# Patient Record
Sex: MALE | Race: WHITE
[De-identification: names, ages, dates, MRNs, and addresses within clinical notes are randomized per-mention and may not be internally consistent; named-entity substitution may affect disease eponyms.]

---

## 2022-03-31 ENCOUNTER — HOSPITAL ENCOUNTER (INPATIENT)
Dept: HOSPITAL 26 - MED | Age: 57
LOS: 4 days | Discharge: HOME | DRG: 189 | End: 2022-04-04
Attending: STUDENT IN AN ORGANIZED HEALTH CARE EDUCATION/TRAINING PROGRAM | Admitting: STUDENT IN AN ORGANIZED HEALTH CARE EDUCATION/TRAINING PROGRAM
Payer: COMMERCIAL

## 2022-03-31 VITALS — WEIGHT: 159 LBS | BODY MASS INDEX: 24.1 KG/M2 | HEIGHT: 68 IN

## 2022-03-31 VITALS — SYSTOLIC BLOOD PRESSURE: 139 MMHG | DIASTOLIC BLOOD PRESSURE: 72 MMHG

## 2022-03-31 DIAGNOSIS — Z87.891: ICD-10-CM

## 2022-03-31 DIAGNOSIS — Z20.822: ICD-10-CM

## 2022-03-31 DIAGNOSIS — J44.1: ICD-10-CM

## 2022-03-31 DIAGNOSIS — Z79.899: ICD-10-CM

## 2022-03-31 DIAGNOSIS — J96.22: ICD-10-CM

## 2022-03-31 DIAGNOSIS — J96.21: Primary | ICD-10-CM

## 2022-03-31 LAB
ALBUMIN FLD-MCNC: 3.4 G/DL (ref 3.4–5)
ANION GAP SERPL CALCULATED.3IONS-SCNC: 3.4 MMOL/L (ref 8–16)
AST SERPL-CCNC: 34 U/L (ref 15–37)
BASOPHILS # BLD AUTO: 0.1 K/UL (ref 0–0.22)
BASOPHILS NFR BLD AUTO: 0.7 % (ref 0–2)
BILIRUB SERPL-MCNC: 0.2 MG/DL (ref 0–1)
BUN SERPL-MCNC: 11 MG/DL (ref 7–18)
CHLORIDE SERPL-SCNC: 96 MMOL/L (ref 98–107)
CO2 SERPL-SCNC: 44.5 MMOL/L (ref 21–32)
CREAT SERPL-MCNC: 0.5 MG/DL (ref 0.6–1.3)
EOSINOPHIL # BLD AUTO: 0.1 K/UL (ref 0–0.4)
EOSINOPHIL NFR BLD AUTO: 0.8 % (ref 0–4)
ERYTHROCYTE [DISTWIDTH] IN BLOOD BY AUTOMATED COUNT: 14.2 % (ref 11.6–13.7)
GFR SERPL CREATININE-BSD FRML MDRD: 220 ML/MIN (ref 90–?)
GLUCOSE SERPL-MCNC: 198 MG/DL (ref 74–106)
HCT VFR BLD AUTO: 39.1 % (ref 36–52)
HGB BLD-MCNC: 12.6 G/DL (ref 12–18)
LYMPHOCYTES # BLD AUTO: 0.3 K/UL (ref 2–11.5)
LYMPHOCYTES NFR BLD AUTO: 3.6 % (ref 20.5–51.1)
MCH RBC QN AUTO: 30 PG (ref 27–31)
MCHC RBC AUTO-ENTMCNC: 32 G/DL (ref 33–37)
MCV RBC AUTO: 94.1 FL (ref 80–94)
MONOCYTES # BLD AUTO: 0.3 K/UL (ref 0.8–1)
MONOCYTES NFR BLD AUTO: 3.6 % (ref 1.7–9.3)
NEUTROPHILS # BLD AUTO: 8.2 K/UL (ref 1.8–7.7)
NEUTROPHILS NFR BLD AUTO: 91.3 % (ref 42.2–75.2)
PLATELET # BLD AUTO: 217 K/UL (ref 140–450)
POTASSIUM SERPL-SCNC: 4.9 MMOL/L (ref 3.5–5.1)
PROTHROMBIN TIME: 8.9 SECS (ref 10.8–13.4)
RBC # BLD AUTO: 4.15 MIL/UL (ref 4.2–6.1)
SODIUM SERPL-SCNC: 139 MMOL/L (ref 136–145)
WBC # BLD AUTO: 9 K/UL (ref 4.8–10.8)

## 2022-03-31 PROCEDURE — 99285 EMERGENCY DEPT VISIT HI MDM: CPT

## 2022-03-31 PROCEDURE — 96365 THER/PROPH/DIAG IV INF INIT: CPT

## 2022-03-31 PROCEDURE — 96375 TX/PRO/DX INJ NEW DRUG ADDON: CPT

## 2022-03-31 PROCEDURE — G0378 HOSPITAL OBSERVATION PER HR: HCPCS

## 2022-03-31 RX ADMIN — ZOLPIDEM TARTRATE PRN MG: 5 TABLET ORAL at 23:33

## 2022-03-31 RX ADMIN — WATER SCH MG: 1 INJECTION INTRAMUSCULAR; INTRAVENOUS; SUBCUTANEOUS at 21:52

## 2022-03-31 RX ADMIN — MORPHINE SULFATE PRN MG: 2 INJECTION, SOLUTION INTRAMUSCULAR; INTRAVENOUS at 23:19

## 2022-03-31 NOTE — NUR
PT ARRIVED ONTO UNIT VIA WHEELCHAIR ACCOMPANIED BY ER NURSE. PT IS AWAKE, ALERT, AND 
COOPERATIVE. PT ON 4L 02 VIA NC. HR REGULAR, 86 BMP, S1& S2 NOTED. ABD IS NONTENDER, 
NONDISTENDED WITH BOWEL SOUNDS PRESENT. SKIN IS WARM, DRY, AND INTACT. MRSA SCREEN DONE. 
VITALS TAKEN. CALL LIGHT WITHIN REACH. ALL SAFETY MEASURES IN PLACE. WILL CONTINUE TO 
MONITOR.

## 2022-03-31 NOTE — NUR
Chart checked and completed.  The patient's care was reviewed and supervised by 
Karissa Bryan, RN, RN.

## 2022-03-31 NOTE — NUR
RECEIVED REPORT FROM AM NURSE. PT IN BED RESTING COMFORTABLY WITH O2 AT 4L NC SATING AT 95%. 
NO ACUTE DISTRESS NOTED. RESPIRATION REGULAR UNLABORED. ALL SAFETY PRECAUTIONS ARE IN PLACE. 
CALL LIGHT WITHIN REACH. NO COMPLAINTS OF PAIN. WILL CONTINUE TO MONITOR.

## 2022-03-31 NOTE — NUR
56/M BIB WIFE WITH C/O SOB X3 DAYS. STATES HX OF COPD AND STATES MORE SOB THAN 
NORMAL, PATIENT ON 4L NC ON ARRIVAL, REPORTS BASELINE O2 SATS IS 95%. STATES HE 
IS CURRENTLY ON HOSPICE AND WAS REFERRED BY THE NURSE TO COME TO ED. O2 SAT 87% 
IN TRIAGE. WHEEZING HEARD THROUGHOUT AND CRACKLES HEARD IN BASES OF PATIENTS 
LUNGS. PT DENIES ANY CP, N/V, FEVER/CHILLS. PT ALSO STATED HE HAS HAD WET COUGH 
AND HAS BEEN PRODUCING MORE MUCOUS MORE THAN USUAL. PT A&OX4, EQUAL CHEST RISE 
AND FALL. 





MEDHX: COPD, EMPHYSEMA, "CARDIAC PROBLEMS"

ALLERGIES: NKA

## 2022-03-31 NOTE — NUR
ENDORSED PT TO NIGHT SHIFT NURSE FOR CONTINUITY OF CARE. ALL NEEDS MET THROUGHOUT SHIFT. PT 
IS STABLE.

## 2022-03-31 NOTE — NUR
Problem: Hemodialysis  Goal: Dialysis: Safe, effective, and comfortable hemodialysis treatment (Hemodialysis nurse only)  Outcome: Outcome Met, Continue evaluating goal progress toward completion  Note: Tolerated dialysis well. Removed 2kg in 3hr. 1 unit PRBC given, no complications  Goal: Dialysis: Free of complications related to initiation/termination of dialysis (Hemodialysis nurse only)  Outcome: Outcome Met, Continue evaluating goal progress toward completion  Note: No access complications.  throughout treatment      Patient will be admitted to care of Dr. Echeverria. Admited to Med surg .  Will go 
to room 121B. Belongings list completed.  Report to CAPRI Monroy.

## 2022-04-01 VITALS — SYSTOLIC BLOOD PRESSURE: 170 MMHG | DIASTOLIC BLOOD PRESSURE: 88 MMHG

## 2022-04-01 VITALS — DIASTOLIC BLOOD PRESSURE: 61 MMHG | SYSTOLIC BLOOD PRESSURE: 113 MMHG

## 2022-04-01 LAB
ANION GAP SERPL CALCULATED.3IONS-SCNC: 4.1 MMOL/L (ref 8–16)
BASOPHILS # BLD AUTO: 0 K/UL (ref 0–0.22)
BASOPHILS NFR BLD AUTO: 0 % (ref 0–2)
BUN SERPL-MCNC: 11 MG/DL (ref 7–18)
CHLORIDE SERPL-SCNC: 97 MMOL/L (ref 98–107)
CO2 SERPL-SCNC: 42.7 MMOL/L (ref 21–32)
CREAT SERPL-MCNC: 0.6 MG/DL (ref 0.6–1.3)
EOSINOPHIL # BLD AUTO: 0 K/UL (ref 0–0.4)
EOSINOPHIL NFR BLD AUTO: 0 % (ref 0–4)
ERYTHROCYTE [DISTWIDTH] IN BLOOD BY AUTOMATED COUNT: 14.2 % (ref 11.6–13.7)
GFR SERPL CREATININE-BSD FRML MDRD: 179 ML/MIN (ref 90–?)
GLUCOSE SERPL-MCNC: 186 MG/DL (ref 74–106)
HCT VFR BLD AUTO: 39 % (ref 36–52)
HGB BLD-MCNC: 12.7 G/DL (ref 12–18)
LYMPHOCYTES # BLD AUTO: 0.3 K/UL (ref 2–11.5)
LYMPHOCYTES NFR BLD AUTO: 2.9 % (ref 20.5–51.1)
MCH RBC QN AUTO: 30 PG (ref 27–31)
MCHC RBC AUTO-ENTMCNC: 33 G/DL (ref 33–37)
MCV RBC AUTO: 92.5 FL (ref 80–94)
MONOCYTES # BLD AUTO: 0.1 K/UL (ref 0.8–1)
MONOCYTES NFR BLD AUTO: 1.2 % (ref 1.7–9.3)
NEUTROPHILS # BLD AUTO: 9.7 K/UL (ref 1.8–7.7)
NEUTROPHILS NFR BLD AUTO: 95.9 % (ref 42.2–75.2)
PLATELET # BLD AUTO: 234 K/UL (ref 140–450)
POTASSIUM SERPL-SCNC: 4.8 MMOL/L (ref 3.5–5.1)
RBC # BLD AUTO: 4.22 MIL/UL (ref 4.2–6.1)
SODIUM SERPL-SCNC: 139 MMOL/L (ref 136–145)
WBC # BLD AUTO: 10.1 K/UL (ref 4.8–10.8)

## 2022-04-01 RX ADMIN — MORPHINE SULFATE PRN MG: 2 INJECTION, SOLUTION INTRAMUSCULAR; INTRAVENOUS at 11:31

## 2022-04-01 RX ADMIN — WATER SCH MG: 1 INJECTION INTRAMUSCULAR; INTRAVENOUS; SUBCUTANEOUS at 13:11

## 2022-04-01 RX ADMIN — SODIUM CHLORIDE PRN MG: 9 INJECTION, SOLUTION INTRAVENOUS at 18:19

## 2022-04-01 RX ADMIN — WATER SCH MG: 1 INJECTION INTRAMUSCULAR; INTRAVENOUS; SUBCUTANEOUS at 05:31

## 2022-04-01 RX ADMIN — DOXYCYCLINE HYCLATE SCH MG: 100 CAPSULE, GELATIN COATED ORAL at 21:13

## 2022-04-01 RX ADMIN — IPRATROPIUM BROMIDE AND ALBUTEROL SULFATE PRN ML: .5; 3 SOLUTION RESPIRATORY (INHALATION) at 19:10

## 2022-04-01 RX ADMIN — SODIUM CHLORIDE PRN MG: 9 INJECTION, SOLUTION INTRAVENOUS at 13:15

## 2022-04-01 RX ADMIN — SODIUM CHLORIDE PRN MG: 9 INJECTION, SOLUTION INTRAVENOUS at 22:29

## 2022-04-01 RX ADMIN — IPRATROPIUM BROMIDE AND ALBUTEROL SULFATE PRN ML: .5; 3 SOLUTION RESPIRATORY (INHALATION) at 05:52

## 2022-04-01 NOTE — NUR
SPOKE WITH DR RAY AND PHARMACY. NEW ORDER RECEIVED FOR MORPHINE PRN FOR DYSPNEA AND 
COMFORT MEASURES. PT AWAITING TRANSFER TO CT. CALL LIGHT IN REACH. ALL SAFETY MEASURES IN 
PLACE

## 2022-04-01 NOTE — NUR
DC PLANNIN YRS OLD MALE PATIENT WAS ADMITTED FROM HOME WITH A DX OF COPD EXACERBATION AND HYPOXIA. 
PATIENT HAS A HISTORY OF COPD, ON HOSPICE PRIOR TO ADMISSION AND HOME O2 WITH 4L/NC. CXR 
SHOWED NO ACUTE CARDIOPULMONARY DISEASE. RAPID COVID TEST NEGATIVE. ADMINISTERED O2 4L/NC IV 
ABX ROCEPHIN AND SOLU-MEDROL IV. CONSULTED WITH PULMO. DC PLAN TO GO HOME WHEN STABLE CM TO 
FOLLOW 

-------------------------------------------------------------------------------

Addendum: 22 at 1301 by Bing Griffin RN

-------------------------------------------------------------------------------

DC PLANNING:

CM MET PATIENT AT THE BED SIDE, PT  STATED I CAN TALK TO HIS WIFE SHE IS HIS ADVOCATE AND 
DECISION MAKER. CALLED PT'S WIFE  SPOKE WITH BEBO STATED PATIENT WAS TREATED 
AT [facility] ON 2020 ,HAD A PROCEDURE FOR PARTIAL LUNG LOBECTOMY AND WAITING FOR LUNG 
TRANSPLANT. HOWEVER ON 2021 GOT REJECTED AND NO LONGER CANDIDATE FOR LUNG TRANSPLANT. 
PATIENT WAS TREATED AT  [facility] WITH HIS PULMONOLOGIST AND SENT HIM TO PALLIATIVE CARE. ON 2022 SIGNED WITH [facility] [id number]  [phone]. HAS HOME O2 4L/NC WITH [employer]  [facility] AND HOSPITAL BED. PER BEBO CALLED [facility] AND SIGNED 
THE PAPER TO REVOKE HOSPICE.  PER PATIENT AND HIS WIFE NO HOSPICE AND WANTED TO CONTINUE 
WITH THE TREATMENT. CM TO FOLLOW 

-------------------------------------------------------------------------------

Addendum: 22 at 1303 by Bing Griffin RN

-------------------------------------------------------------------------------

DC PLANNING:

CM CALLED [employer]  SPOKE WITH MARIO LYLE UPDATED PT'S CLINICAL AND SHE PROVIDED THE AUTH # 
[id number]. CM TO FOLLOW 

-------------------------------------------------------------------------------

Addendum: 22 at 1138 by Bing Griffin RN

-------------------------------------------------------------------------------

DC PLANNING:

RECEIVED A CALL FROM ABDELRAHMAN PT'S WIFE STATED [employer] TOLD HER THAT IF PT IS NOT 
HOSPICE THEY CAN NOT DELIVER OXYGEN. KAVITHA LEUNG DISCUSSED WITH MARIO LYLE STATED ONCE 
THEY RECEIVED THE SIGNED O2 FORM WILL WORKING ON IT. CM TO FOLLOW 

-------------------------------------------------------------------------------

Addendum: 22 at 1453 by Bing Griffin RN

-------------------------------------------------------------------------------

DC PLANNING:

RECEIVED A CALL FROM [facility] SPOKE WITH MARIO STATED [facility] IS CONTRACTED WITH [employer] AND THE AUTH [id number] . CALLED [facility] SPOKE WITH NELI STATED THE ETA BETWEEN 
4-7PM . NOTIFIED PT'S WIFE ABDELRAHMAN AND PATIENT CM TO FOLLOW 

-------------------------------------------------------------------------------

Addendum: 22 at 1547 by Bing Griffin RN

-------------------------------------------------------------------------------

DC PLANNING:

PT'S WIFE ABDELRAHMAN CALLED [facility] AND REQUESTING ALL PORTABLE AND CONCENTRATOR TO BE 
DELIVERED AT HOME. PATIENT IS VERY ANXIOUS TO GO HOME STATED HE HAS HOME O2 WITH PORTABLE 
AND DOESN'T HAVE TO WAIT FOR THE SUPER CARE OXYGEN. CALLED [facility] SPOKE WITH RODNEY 
STATED THEY WILL DELIVER AT HOME IN [place]. PT IS STABLE TO GO HOME. CM TO FOLLOW

## 2022-04-01 NOTE — NUR
C/O SLEEPLESSNESS /82 , O2 SAT 94 % , WY 97 , RR 20 - WILL MEDICATE - ON BI PAP - WILL 
INFORM RT -  WILL CLOSELY MONITOR

## 2022-04-01 NOTE — NUR
PATIENT HAS BEEN SCREENED AND CATEGORIZED AS MODERATE NUTRITION RISK. PATIENT WILL BE SEEN 
WITHIN 3-5 DAYS OF ADMISSION.



4/1/224/5/22



LAM CAVANAUGH RD

## 2022-04-01 NOTE — NUR
PT MEDICATED FOR PAIN AND DYSPNEA. FAMILY AT BEDSIDE. CALL LIGHT IN REACH. ALL SAFETY 
MEASURES IN PLACE. 4L NC. NO S/S OF DISTRESS.

## 2022-04-01 NOTE — NUR
PATIENT MEDICATED FOR PAIN PER MD ORDER FOR PAIN AND DYSPNEA  10 ON SCALE, PATIENT 
VERBALIZES THAT THE PAIN MEDICINE HE RECEIVED WAS NOT EFFECTIVE

## 2022-04-02 VITALS — DIASTOLIC BLOOD PRESSURE: 82 MMHG | SYSTOLIC BLOOD PRESSURE: 140 MMHG

## 2022-04-02 VITALS — SYSTOLIC BLOOD PRESSURE: 160 MMHG | DIASTOLIC BLOOD PRESSURE: 82 MMHG

## 2022-04-02 VITALS — DIASTOLIC BLOOD PRESSURE: 89 MMHG | SYSTOLIC BLOOD PRESSURE: 152 MMHG

## 2022-04-02 RX ADMIN — DOXYCYCLINE HYCLATE SCH MG: 100 CAPSULE, GELATIN COATED ORAL at 20:30

## 2022-04-02 RX ADMIN — SODIUM CHLORIDE PRN MG: 9 INJECTION, SOLUTION INTRAVENOUS at 09:09

## 2022-04-02 RX ADMIN — SODIUM CHLORIDE PRN MG: 9 INJECTION, SOLUTION INTRAVENOUS at 13:52

## 2022-04-02 RX ADMIN — IPRATROPIUM BROMIDE AND ALBUTEROL SULFATE PRN ML: .5; 3 SOLUTION RESPIRATORY (INHALATION) at 19:14

## 2022-04-02 RX ADMIN — DOXYCYCLINE HYCLATE SCH MG: 100 CAPSULE, GELATIN COATED ORAL at 09:00

## 2022-04-02 RX ADMIN — ZOLPIDEM TARTRATE PRN MG: 5 TABLET ORAL at 00:05

## 2022-04-02 RX ADMIN — SODIUM CHLORIDE PRN MG: 9 INJECTION, SOLUTION INTRAVENOUS at 18:00

## 2022-04-02 RX ADMIN — IPRATROPIUM BROMIDE AND ALBUTEROL SULFATE PRN ML: .5; 3 SOLUTION RESPIRATORY (INHALATION) at 09:13

## 2022-04-02 RX ADMIN — ENOXAPARIN SODIUM SCH MG: 40 INJECTION SUBCUTANEOUS at 09:00

## 2022-04-02 RX ADMIN — SODIUM CHLORIDE PRN MG: 9 INJECTION, SOLUTION INTRAVENOUS at 22:00

## 2022-04-02 NOTE — NUR
PT LYING ON BED, TALKING WITH A VISITOR. PT. REQUESTED FOR HOT TEA, HOT TEA GIVEN. PT DENIES 
PAIN AS OF THIS TIME. PT. ON 4L O2, NC. NO DISTRESS AT THIS TIME.

## 2022-04-02 NOTE — NUR
RT AT BEDSIDE AS CALLED BY RN TO PROVIDE BREATHING TREATMENT. PT STATES HE IS HAVING SOB. NC 
IN PLACE AT 4L O2 NC. O2 SAT %. MONITORED AND BREATHING IMPROVED. PT IS STABLE.

## 2022-04-02 NOTE — NUR
PT FINISHED EATING AND STATED HE DOESN'T WANT TO UTILIZE NIV FOR THE NIGHT     PT WAS 
EDUCATED/ENCOURAGED TO UTILIZE NOC NIV AND CONTINUES TO POLITELY REFUSE    PT WAS INFORMED 
SHOULD HE CHANGE HIS MIND TO CALL FOR ME AND I WILL RETURN TO REPLACE BIPAP ON PT    WILL 
CONTINUE TO MONITOR

## 2022-04-02 NOTE — NUR
RECEIVED BEDSIDE REPORT FROM NIGHT SHIFT NURSE FOR CONTINUITY OF CARE. PT IS AWAKE AND 
ALERT. A&OX4. ON 4L O2 NC WITH BREATHING UNLABORED. BIPAP FOR NIGHT USE. AMBULATORY 
INDEPENDENTLY. URINAL AT THE BEDSIDE FOR VOIDING. CONTINENT OF THE BOWEL AND BLADDER. SKIN 
IS WARM, DRY, AND INTACT. IV IS IN THE LEFT WRIST AND RIGHT UA INTACT AND PATENT. NO 
DISTRESS NOTED. PT IS STABLE. PLAN OF CARE DISCUSSED.

## 2022-04-02 NOTE — NUR
PT LYING COMFORTABLY ON HIS BED. NO COMPLAINTS OF PAIN AT THIS TIME. PT ON NC AT 4L O2, WITH 
UNLABORED BREATHING. WILL CONTINUE TO MONITOR.

## 2022-04-02 NOTE — NUR
PT HAS EPIGASTRIC PAIN, WITH A PAIN LEVEL OF 7 OUT OF 10. GAVE MORPHINE IVP, AS PER ORDERED. 
WILL REASSESS PAIN LEVEL IN AN HOUR.

## 2022-04-02 NOTE — NUR
PT STATED PAIN IN UPPER ABDOMEN, AT A SCALE OF 7 OUT OF 10. BP WAS STABLE. PT WAS GIVEN 
MORPHINE PRN, AS ORDERED. WILL CONTINUE TO MONITOR.

## 2022-04-02 NOTE — NUR
PER MY RN ORIENTEE PT REQUESTED FOR PAIN FOR HA - BUT WHEN I VISITING THE PT - PT SLEEPING . 
WILL CONT. TO MONITOR . CALL LIGHT WITHIN REACH .

## 2022-04-02 NOTE — NUR
NOC NIV INITIATED AS PT STATED HE WAS READY FOR BED     PT PLACED ON NIV AS SETTINGS WERE 
PREV SET 14/6 f16 30%  ALARMS ON AND AUDIBLE     BIPAP PLUGGED INTO RED OUTLET     FACIAL 
BARRIER IN PLACE     WILL CONTINUE TO MONITOR

## 2022-04-02 NOTE — NUR
SLEEPING - CHEST RISE AND FALL EQAULLY - O2 SAT 93

5 , CALL LIGHT WITHIN REACH . WILL CONT. TO MONITOR

## 2022-04-02 NOTE — NUR
PT WAS GIVEN MORPHINE PRN FOR PAIN IN THE ABD AT A SCALE OF 8/10. BP WAS STABLE PRIOR TO 
ADMINISTRATION OF MEDICATION. WILL MONITOR PAIN. Patient daughter aware and voiced understanding, no concerns voiced at this time.

## 2022-04-02 NOTE — NUR
PT DENIES PAIN AT THIS TIME. BREATHING IS DEEP AND LABORED. PT DENIES ANY RESPIRATORY 
DISTRESS. O2 SAT % ON 4L O2 NC. WILL CONTINUE TO MONITOR.

## 2022-04-03 VITALS — DIASTOLIC BLOOD PRESSURE: 85 MMHG | SYSTOLIC BLOOD PRESSURE: 149 MMHG

## 2022-04-03 VITALS — SYSTOLIC BLOOD PRESSURE: 119 MMHG | DIASTOLIC BLOOD PRESSURE: 87 MMHG

## 2022-04-03 VITALS — DIASTOLIC BLOOD PRESSURE: 76 MMHG | SYSTOLIC BLOOD PRESSURE: 127 MMHG

## 2022-04-03 LAB
ALBUMIN FLD-MCNC: 3.2 G/DL (ref 3.4–5)
ANION GAP SERPL CALCULATED.3IONS-SCNC: 3 MMOL/L (ref 8–16)
AST SERPL-CCNC: 23 U/L (ref 15–37)
BASOPHILS # BLD AUTO: 0 K/UL (ref 0–0.22)
BASOPHILS NFR BLD AUTO: 0.1 % (ref 0–2)
BILIRUB SERPL-MCNC: 0.3 MG/DL (ref 0–1)
BUN SERPL-MCNC: 16 MG/DL (ref 7–18)
CHLORIDE SERPL-SCNC: 96 MMOL/L (ref 98–107)
CO2 SERPL-SCNC: 46.2 MMOL/L (ref 21–32)
CREAT SERPL-MCNC: 0.6 MG/DL (ref 0.6–1.3)
EOSINOPHIL # BLD AUTO: 0.3 K/UL (ref 0–0.4)
EOSINOPHIL NFR BLD AUTO: 2.7 % (ref 0–4)
ERYTHROCYTE [DISTWIDTH] IN BLOOD BY AUTOMATED COUNT: 14.4 % (ref 11.6–13.7)
GFR SERPL CREATININE-BSD FRML MDRD: 179 ML/MIN (ref 90–?)
GLUCOSE SERPL-MCNC: 142 MG/DL (ref 74–106)
HCT VFR BLD AUTO: 40.6 % (ref 36–52)
HGB BLD-MCNC: 13.4 G/DL (ref 12–18)
LYMPHOCYTES # BLD AUTO: 0.8 K/UL (ref 2–11.5)
LYMPHOCYTES NFR BLD AUTO: 8.2 % (ref 20.5–51.1)
MCH RBC QN AUTO: 30 PG (ref 27–31)
MCHC RBC AUTO-ENTMCNC: 33 G/DL (ref 33–37)
MCV RBC AUTO: 92.4 FL (ref 80–94)
MONOCYTES # BLD AUTO: 0.8 K/UL (ref 0.8–1)
MONOCYTES NFR BLD AUTO: 7.5 % (ref 1.7–9.3)
NEUTROPHILS # BLD AUTO: 8.2 K/UL (ref 1.8–7.7)
NEUTROPHILS NFR BLD AUTO: 81.5 % (ref 42.2–75.2)
PLATELET # BLD AUTO: 246 K/UL (ref 140–450)
POTASSIUM SERPL-SCNC: 4.2 MMOL/L (ref 3.5–5.1)
RBC # BLD AUTO: 4.39 MIL/UL (ref 4.2–6.1)
SODIUM SERPL-SCNC: 141 MMOL/L (ref 136–145)
WBC # BLD AUTO: 10 K/UL (ref 4.8–10.8)

## 2022-04-03 RX ADMIN — SODIUM CHLORIDE PRN MG: 9 INJECTION, SOLUTION INTRAVENOUS at 06:52

## 2022-04-03 RX ADMIN — HYDROCODONE BITARTRATE AND ACETAMINOPHEN PRN TAB: 5; 325 TABLET ORAL at 19:43

## 2022-04-03 RX ADMIN — SODIUM CHLORIDE PRN MG: 9 INJECTION, SOLUTION INTRAVENOUS at 02:26

## 2022-04-03 RX ADMIN — SODIUM CHLORIDE PRN MG: 9 INJECTION, SOLUTION INTRAVENOUS at 22:52

## 2022-04-03 RX ADMIN — ENOXAPARIN SODIUM SCH MG: 40 INJECTION SUBCUTANEOUS at 08:39

## 2022-04-03 RX ADMIN — DOXYCYCLINE HYCLATE SCH MG: 100 CAPSULE, GELATIN COATED ORAL at 08:38

## 2022-04-03 RX ADMIN — IPRATROPIUM BROMIDE AND ALBUTEROL SULFATE PRN ML: .5; 3 SOLUTION RESPIRATORY (INHALATION) at 10:39

## 2022-04-03 RX ADMIN — ZOLPIDEM TARTRATE PRN MG: 5 TABLET ORAL at 02:27

## 2022-04-03 RX ADMIN — IPRATROPIUM BROMIDE AND ALBUTEROL SULFATE PRN ML: .5; 3 SOLUTION RESPIRATORY (INHALATION) at 21:51

## 2022-04-03 RX ADMIN — DOXYCYCLINE HYCLATE SCH MG: 100 CAPSULE, GELATIN COATED ORAL at 20:39

## 2022-04-03 RX ADMIN — HYDROCODONE BITARTRATE AND ACETAMINOPHEN PRN TAB: 5; 325 TABLET ORAL at 13:10

## 2022-04-03 RX ADMIN — SODIUM CHLORIDE PRN MG: 9 INJECTION, SOLUTION INTRAVENOUS at 16:54

## 2022-04-03 NOTE — NUR
CONTACTED BY RN ABOUT PLACING PT ON NOC BI-PAP. PLACED PT ON BI-PAP WITH SKIN BARRIER IN 
PLACE. ENSURED PT WAS COMFORTABLE, MONITORED PT FOR SEVERAL MINUTES, PT WAS WAS COMFORTABLE 
AND IN NO DISTRESS. INFORMED BY RN ON 04/04 @ APPROXIMATELY 0015, PT WAS UNCOMFORTABLE AND 
HAD REMOVED BI-PAP MASK. RN PLACED BI-PAP ON STANDBY, AND PLACED PT ON 4L NASAL CANNULA. PT 
MAINTAINS SATURATIONS ABOVE 92% WHILE ASLEEP ON THE 4L N/C. PT IS COMFORTABLE AND IN NO 
DISTRESS

## 2022-04-03 NOTE — NUR
RECEIVED BEDSIDE REPORT FROM NIGHT SHIFT NURSE FOR CONTINUITY OF CARE. PT IS AOX4, ABLE TO 
MAKE NEEDS KNOWN. RESPIRATIONS EVEN AND UNLABORED. ON 4L NC O2 SATURATION 98% AND NO 
DISTRESS NOTED. SKIN IS WARM, DRY, AND INTACT. IV SITE ON MADHU 22G INTACT AND PATENT, SALINE 
LOCKED. DENIES PAIN AT THE MOMENT. PLAN OF CARE DISCUSSED. SAFETY PRECAUTIONS IN PLACE. CALL 
LIGHT WITHIN REACH. WILL CONTINUE TO MONITOR.

## 2022-04-03 NOTE — NUR
PATIENT SLEEPING ,NO S/SX OF DISTRESS NOTED. ALL SAFETY MEASURES IN PLACE, CALL LIGHT WITHIN 
EASY REACH

## 2022-04-03 NOTE — NUR
PT STATES PAIN IN THE ABD AT A SCALE OF 7/10. PT WAS GIVEN MORPHINE FOR PAIN PRN. BP WAS 
127/76 PRIOR TO ADMINISTRATION OF MEDICATION. WILL MONITOR.

## 2022-04-04 VITALS — SYSTOLIC BLOOD PRESSURE: 131 MMHG | DIASTOLIC BLOOD PRESSURE: 87 MMHG

## 2022-04-04 VITALS — SYSTOLIC BLOOD PRESSURE: 149 MMHG | DIASTOLIC BLOOD PRESSURE: 74 MMHG

## 2022-04-04 RX ADMIN — ENOXAPARIN SODIUM SCH MG: 40 INJECTION SUBCUTANEOUS at 08:11

## 2022-04-04 RX ADMIN — DOXYCYCLINE HYCLATE SCH MG: 100 CAPSULE, GELATIN COATED ORAL at 08:12

## 2022-04-04 RX ADMIN — HYDROCODONE BITARTRATE AND ACETAMINOPHEN PRN TAB: 5; 325 TABLET ORAL at 15:13

## 2022-04-04 RX ADMIN — ZOLPIDEM TARTRATE PRN MG: 5 TABLET ORAL at 03:47

## 2022-04-04 RX ADMIN — HYDROCODONE BITARTRATE AND ACETAMINOPHEN PRN TAB: 5; 325 TABLET ORAL at 08:09

## 2022-04-04 RX ADMIN — HYDROCODONE BITARTRATE AND ACETAMINOPHEN PRN TAB: 5; 325 TABLET ORAL at 01:15

## 2022-04-04 RX ADMIN — IPRATROPIUM BROMIDE AND ALBUTEROL SULFATE PRN ML: .5; 3 SOLUTION RESPIRATORY (INHALATION) at 10:09

## 2022-04-04 RX ADMIN — IPRATROPIUM BROMIDE AND ALBUTEROL SULFATE PRN ML: .5; 3 SOLUTION RESPIRATORY (INHALATION) at 07:41

## 2022-04-04 RX ADMIN — SODIUM CHLORIDE PRN MG: 9 INJECTION, SOLUTION INTRAVENOUS at 03:46

## 2022-04-04 NOTE — NUR
explained to the patient that if i give him pain med that i am going to watch him before he 
leave and the patient got angry and tell me back off you just do not want to give med and  
patient wanted me leave to  his room, explained the reason to not let him go with iv 
narcotic medicine Administered [email or web address]

## 2022-04-04 NOTE — NUR
PATIENT DISCHARGED, WIFE WAS WITH HIM, NURSE ESCORTED TO THE CAR BY W\C PATIENT HAD HIS OWN 
HOME O2 VIA NC ON THE DISCHARGE, HL REMOVED. SEEMS NO  DISCOMFORT. [id number]

## 2023-02-05 ENCOUNTER — HOSPITAL ENCOUNTER (INPATIENT)
Dept: HOSPITAL 26 - MED | Age: 58
LOS: 6 days | DRG: 193 | End: 2023-02-11
Attending: STUDENT IN AN ORGANIZED HEALTH CARE EDUCATION/TRAINING PROGRAM | Admitting: STUDENT IN AN ORGANIZED HEALTH CARE EDUCATION/TRAINING PROGRAM
Payer: COMMERCIAL

## 2023-02-05 VITALS — DIASTOLIC BLOOD PRESSURE: 86 MMHG | SYSTOLIC BLOOD PRESSURE: 186 MMHG

## 2023-02-05 VITALS — BODY MASS INDEX: 24.01 KG/M2 | WEIGHT: 153.01 LBS | HEIGHT: 67 IN

## 2023-02-05 DIAGNOSIS — Z20.822: ICD-10-CM

## 2023-02-05 DIAGNOSIS — J45.909: ICD-10-CM

## 2023-02-05 DIAGNOSIS — J44.1: ICD-10-CM

## 2023-02-05 DIAGNOSIS — J96.21: ICD-10-CM

## 2023-02-05 DIAGNOSIS — I10: ICD-10-CM

## 2023-02-05 DIAGNOSIS — E86.0: ICD-10-CM

## 2023-02-05 DIAGNOSIS — R65.10: ICD-10-CM

## 2023-02-05 DIAGNOSIS — Z66: ICD-10-CM

## 2023-02-05 DIAGNOSIS — Z87.891: ICD-10-CM

## 2023-02-05 DIAGNOSIS — J18.9: Primary | ICD-10-CM

## 2023-02-05 DIAGNOSIS — J96.22: ICD-10-CM

## 2023-02-05 DIAGNOSIS — I46.9: ICD-10-CM

## 2023-02-05 DIAGNOSIS — G93.41: ICD-10-CM

## 2023-02-05 LAB
ALBUMIN FLD-MCNC: 4.3 G/DL (ref 3.4–5)
ANION GAP SERPL CALCULATED.3IONS-SCNC: 11.9 MMOL/L (ref 8–16)
AST SERPL-CCNC: 8 U/L (ref 15–37)
BASOPHILS # BLD AUTO: 0 K/UL (ref 0–0.22)
BASOPHILS NFR BLD AUTO: 0 % (ref 0–2)
BILIRUB SERPL-MCNC: 0.8 MG/DL (ref 0–1)
BUN SERPL-MCNC: 18 MG/DL (ref 7–18)
CHLORIDE SERPL-SCNC: 89 MMOL/L (ref 98–107)
CO2 SERPL-SCNC: 47 MMOL/L (ref 21–32)
CREAT SERPL-MCNC: 0.6 MG/DL (ref 0.6–1.3)
EOSINOPHIL # BLD AUTO: 0 K/UL (ref 0–0.4)
EOSINOPHIL NFR BLD AUTO: 0 % (ref 0–4)
ERYTHROCYTE [DISTWIDTH] IN BLOOD BY AUTOMATED COUNT: 13.9 % (ref 11.6–13.7)
GFR SERPL CREATININE-BSD FRML MDRD: 178 ML/MIN (ref 90–?)
GLUCOSE SERPL-MCNC: 161 MG/DL (ref 74–106)
HCT VFR BLD AUTO: 44.9 % (ref 36–52)
HGB BLD-MCNC: 14.7 G/DL (ref 12–18)
LYMPHOCYTES # BLD AUTO: 0.2 K/UL (ref 2–11.5)
LYMPHOCYTES NFR BLD AUTO: 2.4 % (ref 20.5–51.1)
MCH RBC QN AUTO: 30 PG (ref 27–31)
MCHC RBC AUTO-ENTMCNC: 33 G/DL (ref 33–37)
MCV RBC AUTO: 92.6 FL (ref 80–94)
MONOCYTES # BLD AUTO: 0.8 K/UL (ref 0.8–1)
MONOCYTES NFR BLD AUTO: 7.8 % (ref 1.7–9.3)
NEUTROPHILS # BLD AUTO: 8.9 K/UL (ref 1.8–7.7)
NEUTROPHILS NFR BLD AUTO: 89.8 % (ref 42.2–75.2)
PLATELET # BLD AUTO: 222 K/UL (ref 140–450)
POTASSIUM SERPL-SCNC: 4.9 MMOL/L (ref 3.5–5.1)
RBC # BLD AUTO: 4.85 MIL/UL (ref 4.2–6.1)
SODIUM SERPL-SCNC: 143 MMOL/L (ref 136–145)
WBC # BLD AUTO: 10 K/UL (ref 4.8–10.8)

## 2023-02-05 NOTE — NUR
MD's called per Dr. Ingram requesting Neurology at this time.      Robert White  10/10/20 3793     X-Ray at bedside.

## 2023-02-05 NOTE — NUR
pt is here for SOB and chest pain which is 04/10 non radiating anywhere. Pt 
came with nasal canual 2 litter which he claimed that he normally used 4 to 5 
liter at home. he has rale and crackle on the lung sound. He is alert and 
oriented x 4, able to answer the questions and follow command.

## 2023-02-06 VITALS — SYSTOLIC BLOOD PRESSURE: 138 MMHG | DIASTOLIC BLOOD PRESSURE: 82 MMHG

## 2023-02-06 VITALS — SYSTOLIC BLOOD PRESSURE: 136 MMHG | DIASTOLIC BLOOD PRESSURE: 87 MMHG

## 2023-02-06 VITALS — DIASTOLIC BLOOD PRESSURE: 80 MMHG | SYSTOLIC BLOOD PRESSURE: 135 MMHG

## 2023-02-06 VITALS — DIASTOLIC BLOOD PRESSURE: 71 MMHG | SYSTOLIC BLOOD PRESSURE: 102 MMHG

## 2023-02-06 VITALS — DIASTOLIC BLOOD PRESSURE: 71 MMHG | SYSTOLIC BLOOD PRESSURE: 115 MMHG

## 2023-02-06 VITALS — DIASTOLIC BLOOD PRESSURE: 81 MMHG | SYSTOLIC BLOOD PRESSURE: 143 MMHG

## 2023-02-06 LAB
APPEARANCE UR: CLEAR
BILIRUB UR QL STRIP: NEGATIVE
COLOR UR: YELLOW
GLUCOSE UR STRIP-MCNC: NEGATIVE MG/DL
HGB UR QL STRIP: (no result)
LEUKOCYTE ESTERASE UR QL STRIP: NEGATIVE
NITRITE UR QL STRIP: NEGATIVE
PH UR STRIP: 6 [PH] (ref 5–9)
RBC #/AREA URNS HPF: (no result) /HPF (ref 0–5)
WBC,URINE: (no result) /HPF (ref 0–5)

## 2023-02-06 PROCEDURE — 5A09457 ASSISTANCE WITH RESPIRATORY VENTILATION, 24-96 CONSECUTIVE HOURS, CONTINUOUS POSITIVE AIRWAY PRESSURE: ICD-10-PCS

## 2023-02-06 RX ADMIN — IPRATROPIUM BROMIDE AND ALBUTEROL SULFATE SCH ML: .5; 3 SOLUTION RESPIRATORY (INHALATION) at 15:00

## 2023-02-06 RX ADMIN — IPRATROPIUM BROMIDE AND ALBUTEROL SULFATE SCH ML: .5; 3 SOLUTION RESPIRATORY (INHALATION) at 19:08

## 2023-02-06 RX ADMIN — GABAPENTIN SCH MG: 100 CAPSULE ORAL at 09:00

## 2023-02-06 RX ADMIN — GABAPENTIN SCH MG: 100 CAPSULE ORAL at 17:00

## 2023-02-06 RX ADMIN — LOSARTAN POTASSIUM SCH MG: 50 TABLET, FILM COATED ORAL at 08:23

## 2023-02-06 RX ADMIN — GABAPENTIN SCH MG: 100 CAPSULE ORAL at 18:02

## 2023-02-06 RX ADMIN — MORPHINE SULFATE PRN MG: 2 INJECTION, SOLUTION INTRAMUSCULAR; INTRAVENOUS at 18:03

## 2023-02-06 RX ADMIN — IPRATROPIUM BROMIDE AND ALBUTEROL SULFATE SCH ML: .5; 3 SOLUTION RESPIRATORY (INHALATION) at 11:07

## 2023-02-06 RX ADMIN — WATER SCH MG: 1 INJECTION INTRAMUSCULAR; INTRAVENOUS; SUBCUTANEOUS at 18:04

## 2023-02-06 RX ADMIN — IPRATROPIUM BROMIDE AND ALBUTEROL SULFATE SCH ML: .5; 3 SOLUTION RESPIRATORY (INHALATION) at 22:57

## 2023-02-06 NOTE — NUR
DC PLANNING 



SW MET WITH PT AT BEDSIDE TO COMPLETE ASSESSMENT, HOWEVER, PT IS ON BIPAP. 

PTS WIFE BEBO AT BEDSIDE THEREFORE COLLAT INFO GATHERED FROM PT'S WIFE 

BEBO. BEBO PROVIDED AS MUCH INFORMATION KNOWN TO HER. BEBO 

REPORTS PT RESIDES IN A SINGLE STORY MOBILE HOME WITH 

HIS WIFE, DAUGHTER, SON IN LAW AND GRANDSON, AT THE ADDRESS LISTED ON 

FILE. BEBO IDENTIFIED HERSELF, [phone] AND KIANNA SEGOVIA, DAUGHTER, 

[phone] AS PTS EMERGENCY CONTACTS. BEBO REPORTS SHE IS PT'S DPOA 

AND REPORTS SHE WILL BRING COPY AND PROVIDE COPY TO NURSE AT NEXT VISIT. 

BEBO REPORTS PT IS ON HOSPICE SERVICES WITH [facility] 

SINCE OCT 22'. PT IS REPORTED TO BE MEDICATION COMPLIANT AND RECEIVES 

MEDICATION FROM [facility] [place], WHEN NEEDED. PT IS 

REPORTED TO UTILIZE HOME O2 AND IS TOTAL CARE, CARE PROVIDED BY PT'S WIFE. 

BEBO REPORTS PT WAS ON LUNG TRANSPLANT LIST AND WAS REMOVED IN JULY 2020 FOR NON-COMPLIANCE. BEBO REPORTS DC PLAN IS FOR PT TO RETURN HOME AND RESUME 
HOSPICE SERVICES. BEBO REPORTS HX OF CHRONIC STRESSORS AND ACCEPTED MENTAL HEALTH 
RESOURCES PROVIDED BY SW

-------------------------------------------------------------------------------

Addendum: 02/06/23 at 1513 by Kandice MEDEROS

-------------------------------------------------------------------------------

Amended: Links added.

-------------------------------------------------------------------------------

Addendum: 02/09/23 at 1321 by Kandice Smith SS

-------------------------------------------------------------------------------

DC ORDER FOR PT TO DC BACK T HOSPICE. REFERRAL PACKET SENT TO [facility] 
[phone].

## 2023-02-06 NOTE — NUR
pt arrived via bed, connected to heart monitor, with nursing supervisor and RCP. pT 
connected to Bipap very lethargic, orders to monitor and perform another ABG @ 1500. pT 
oriented to name and follws commands. Denies pain or SOB.vital signs stable, call bell in 
reach, side rails up X2, bed in lowest position. Wife @ bedside, answered all questions and 
updated her on plan of care.

## 2023-02-06 NOTE — NUR
RECEIVED PT ON HIGH FLOW VIA NC, THEN BREATHING GOT WORSE TRANSIT TO BIPAP THEN NOW 
TRANSFERRING TO ICU , THE FAMILY NOTIFIED, TALKED TO THE WIFE TWO TIMES SINCE THE SHIFT 
STARTED.[email or web address]

## 2023-02-06 NOTE — NUR
PATIENT IV PULLED OUT ACCIDENTALLY. STARTED A NEW IV ON THE LEFT HAND 22 GAUGE WITH GOOD 
RETURN OF BLOOD. TOLERATED WELL.

## 2023-02-06 NOTE — NUR
CALLED TO BEDSIDE, PT WAS DESATURATING WITH INCREASED WOB AND RETRACTIONS NOTED. HIGH FLOW 
INITIATED @ 40L 55% WITH SOME IMPROVEMENT. ALSO GAVE PT A BREATHING TX. WILL CONTINUE TO 
MONITOR.

## 2023-02-06 NOTE — NUR
DR RAY SAW PT AND CHANGED HIS SETTINGS ON BIPAP DUE TO NO IMPROVEMENT FROM INITIAL 
SETTINGS. PT IS NOW ON BIPAP 18/5 R14 55%. ABG OBTAINED PER DR WEBSTER. BREATHING TXs WERE 
ORDERED. DR RAY WANTS ANOTHER ABG AFTER ONE HOUR OF CHANGED BIPAP SETTINGS . PT IS 
TOLERATING CHANGES. SKIN INTACT AND SKIN PROTECTIVE GEL IN PLACE. WILL CONTINUE TO MONITOR.

## 2023-02-06 NOTE — NUR
RT WAS CALLED TO PT ROOM DUE TO PT SHOPWING SIGNS OF RESP DISTRESS. RT TALKED TO DR RICHARDSON 
TO GET ORDERS FOR BIPAP PT WOB INCREASED. ORDERS WERE APPROVED.PT GOT PRN TX AND WAS PLACED 
ON BIPAP 10/5 R14 55%. WILL CONTINUE TO MONITOR. PT TOLERATING

## 2023-02-06 NOTE — NUR
Pt resting in bed, bipap in place, see MAR for med administration for pain, rated on scale 
of 5 of abdomen, performed oral care as well , pt is very much more alert and awake than 
original admission to ICU room. Wife @ bedside, lights deemed, call bell in place, side 
rails up X2, bed in lowest position.

## 2023-02-06 NOTE — NUR
rec'd pt from Registry RN to assume plan of care. family at the bedside. POC discussed

pt's awake and alert x2, denies pain. No resp distress sats >95%

On continuous bipap 18/5, 14 rate, 55% fi02.

Saline locked on the left hand #22g patent and intact.

2000:  shift assessment done.

2100: given HS snacks witnessed by pt's wife and RT. Pt's able to swallow without  
aspiration.water to drink also given and no aspiration is noted and No coughing.

2200:  pt's voids using urinal 300cc.

0000:  reoriented to room/unit and surroundings, alert and awake.

stable vital signs.

0100:  pt's sleeping comfortably.

## 2023-02-06 NOTE — NUR
Patient will be admitted to care of [facility]. Admited to TELEMETRY.  Will go to 
roOM 120 B. Belongings list completed.  Report to PHIL.

## 2023-02-06 NOTE — NUR
ADMITTED A 56 Y/O MALE PATIENT FROM ER AWAKE ALERT ORIENTED X3. ABLE TO COMMUNICATE WITH HIS 
NEEDS. CC: CHEST PAIN AND SOB X3 DAYS. PATIENT ON O2 5L NC SATING AT 92%. DENIES PAIN AT 
THIS TIME. IV ACCESS ON THE RIGHT HAND 20 GAUGE INTACT AND PATENT. SKIN INTACT. MRSA 
SCREENING DONE. CALL LIGHT WITHIN REACH. SAFETY PRECAUTIONS ARE IN PLACE. NEEDS ATTENDED AND 
MET.

## 2023-02-06 NOTE — NUR
PATIENT IN DISTRESS SATING 89%. PLACED A CALL TO RT. RT CAME TO ASSESS PATIENT. WILL PLACE 
PATIENT ON HIGH FLOW O2 PER RT ADELINE.

## 2023-02-06 NOTE — NUR
PATIENT HAS BEEN SCREENED AND CATEGORIZED AS MODERATE NUTRITION RISK. PATIENT WILL BE SEEN 
WITHIN 3-5 DAYS OF ADMISSION.



2/8/232/10/23



REVIEWED BY LAM CAVANAUGH RD

## 2023-02-07 VITALS — SYSTOLIC BLOOD PRESSURE: 124 MMHG | DIASTOLIC BLOOD PRESSURE: 62 MMHG

## 2023-02-07 VITALS — SYSTOLIC BLOOD PRESSURE: 132 MMHG | DIASTOLIC BLOOD PRESSURE: 79 MMHG

## 2023-02-07 VITALS — SYSTOLIC BLOOD PRESSURE: 118 MMHG | DIASTOLIC BLOOD PRESSURE: 71 MMHG

## 2023-02-07 VITALS — SYSTOLIC BLOOD PRESSURE: 132 MMHG | DIASTOLIC BLOOD PRESSURE: 70 MMHG

## 2023-02-07 VITALS — DIASTOLIC BLOOD PRESSURE: 82 MMHG | SYSTOLIC BLOOD PRESSURE: 138 MMHG

## 2023-02-07 VITALS — SYSTOLIC BLOOD PRESSURE: 111 MMHG | DIASTOLIC BLOOD PRESSURE: 87 MMHG

## 2023-02-07 VITALS — DIASTOLIC BLOOD PRESSURE: 71 MMHG | SYSTOLIC BLOOD PRESSURE: 108 MMHG

## 2023-02-07 VITALS — SYSTOLIC BLOOD PRESSURE: 118 MMHG | DIASTOLIC BLOOD PRESSURE: 75 MMHG

## 2023-02-07 VITALS — DIASTOLIC BLOOD PRESSURE: 88 MMHG | SYSTOLIC BLOOD PRESSURE: 118 MMHG

## 2023-02-07 VITALS — DIASTOLIC BLOOD PRESSURE: 37 MMHG | SYSTOLIC BLOOD PRESSURE: 125 MMHG

## 2023-02-07 LAB
ANION GAP SERPL CALCULATED.3IONS-SCNC: 4.6 MMOL/L (ref 8–16)
BASOPHILS # BLD AUTO: 0 K/UL (ref 0–0.22)
BASOPHILS NFR BLD AUTO: 0 % (ref 0–2)
BUN SERPL-MCNC: 29 MG/DL (ref 7–18)
CHLORIDE SERPL-SCNC: 93 MMOL/L (ref 98–107)
CO2 SERPL-SCNC: 48.2 MMOL/L (ref 21–32)
CREAT SERPL-MCNC: 0.5 MG/DL (ref 0.6–1.3)
EOSINOPHIL # BLD AUTO: 0 K/UL (ref 0–0.4)
EOSINOPHIL NFR BLD AUTO: 0 % (ref 0–4)
ERYTHROCYTE [DISTWIDTH] IN BLOOD BY AUTOMATED COUNT: 14.3 % (ref 11.6–13.7)
GFR SERPL CREATININE-BSD FRML MDRD: 220 ML/MIN (ref 90–?)
GLUCOSE SERPL-MCNC: 135 MG/DL (ref 74–106)
HCT VFR BLD AUTO: 43.6 % (ref 36–52)
HGB BLD-MCNC: 14 G/DL (ref 12–18)
LYMPHOCYTES # BLD AUTO: 0.2 K/UL (ref 2–11.5)
LYMPHOCYTES NFR BLD AUTO: 1 % (ref 20.5–51.1)
MCH RBC QN AUTO: 30 PG (ref 27–31)
MCHC RBC AUTO-ENTMCNC: 32 G/DL (ref 33–37)
MCV RBC AUTO: 93.6 FL (ref 80–94)
MONOCYTES # BLD AUTO: 1.2 K/UL (ref 0.8–1)
MONOCYTES NFR BLD AUTO: 7.3 % (ref 1.7–9.3)
NEUTROPHILS # BLD AUTO: 15.5 K/UL (ref 1.8–7.7)
NEUTROPHILS NFR BLD AUTO: 91.7 % (ref 42.2–75.2)
PLATELET # BLD AUTO: 223 K/UL (ref 140–450)
POTASSIUM SERPL-SCNC: 4.8 MMOL/L (ref 3.5–5.1)
RBC # BLD AUTO: 4.66 MIL/UL (ref 4.2–6.1)
SODIUM SERPL-SCNC: 141 MMOL/L (ref 136–145)
WBC # BLD AUTO: 16.9 K/UL (ref 4.8–10.8)

## 2023-02-07 RX ADMIN — INSULIN HUMAN PRN MLS/HR: 100 INJECTION, SOLUTION PARENTERAL at 14:01

## 2023-02-07 RX ADMIN — WATER SCH MG: 1 INJECTION INTRAMUSCULAR; INTRAVENOUS; SUBCUTANEOUS at 00:00

## 2023-02-07 RX ADMIN — IPRATROPIUM BROMIDE AND ALBUTEROL SULFATE SCH ML: .5; 3 SOLUTION RESPIRATORY (INHALATION) at 14:39

## 2023-02-07 RX ADMIN — IPRATROPIUM BROMIDE AND ALBUTEROL SULFATE SCH ML: .5; 3 SOLUTION RESPIRATORY (INHALATION) at 17:21

## 2023-02-07 RX ADMIN — IPRATROPIUM BROMIDE AND ALBUTEROL SULFATE SCH ML: .5; 3 SOLUTION RESPIRATORY (INHALATION) at 19:56

## 2023-02-07 RX ADMIN — WATER SCH MG: 1 INJECTION INTRAMUSCULAR; INTRAVENOUS; SUBCUTANEOUS at 12:17

## 2023-02-07 RX ADMIN — GABAPENTIN SCH MG: 100 CAPSULE ORAL at 09:00

## 2023-02-07 RX ADMIN — LOSARTAN POTASSIUM SCH MG: 50 TABLET, FILM COATED ORAL at 08:34

## 2023-02-07 RX ADMIN — WATER SCH MG: 1 INJECTION INTRAMUSCULAR; INTRAVENOUS; SUBCUTANEOUS at 17:40

## 2023-02-07 RX ADMIN — INSULIN HUMAN PRN MLS/HR: 100 INJECTION, SOLUTION PARENTERAL at 14:00

## 2023-02-07 RX ADMIN — WATER SCH MG: 1 INJECTION INTRAMUSCULAR; INTRAVENOUS; SUBCUTANEOUS at 06:13

## 2023-02-07 RX ADMIN — IPRATROPIUM BROMIDE AND ALBUTEROL SULFATE SCH ML: .5; 3 SOLUTION RESPIRATORY (INHALATION) at 14:41

## 2023-02-07 RX ADMIN — GABAPENTIN SCH MG: 100 CAPSULE ORAL at 13:00

## 2023-02-07 RX ADMIN — INSULIN HUMAN PRN MLS/HR: 100 INJECTION, SOLUTION PARENTERAL at 23:16

## 2023-02-07 RX ADMIN — GABAPENTIN SCH MG: 100 CAPSULE ORAL at 17:00

## 2023-02-07 RX ADMIN — WATER SCH MG: 1 INJECTION INTRAMUSCULAR; INTRAVENOUS; SUBCUTANEOUS at 23:21

## 2023-02-07 RX ADMIN — IPRATROPIUM BROMIDE AND ALBUTEROL SULFATE SCH ML: .5; 3 SOLUTION RESPIRATORY (INHALATION) at 23:00

## 2023-02-07 RX ADMIN — IPRATROPIUM BROMIDE AND ALBUTEROL SULFATE SCH ML: .5; 3 SOLUTION RESPIRATORY (INHALATION) at 03:26

## 2023-02-07 RX ADMIN — MORPHINE SULFATE PRN MG: 2 INJECTION, SOLUTION INTRAMUSCULAR; INTRAVENOUS at 12:17

## 2023-02-07 NOTE — NUR
DC PLANNIN YRS OLD MALE PATIENT WAS ADMITTED FROM HOME WITH A DX OF COPD, PATIENT HAS A HX OF COPD, 
HTN, AND ASTHMA . ON BIPAP FIO2 55%. CXR SHOWED MILD BIBASILAR SCARRING/DISEASE. RAPID COVID 
TEST NEGATIVE  ADMINISTERED IV ABX ROCEPHIN AND IV SOLU MEDROL. CONSULTED WITH PULMO. DC 
PLAN TO RETURN HOME WHEN STABLE. CM TO FOLLOW 

-------------------------------------------------------------------------------

Addendum: 23 at 0920 by Bing Griffin RN

-------------------------------------------------------------------------------

DC PLANNING:

PATIENT IS ON HF 7L/NC SATING 96% ON PRECEDEX DRIP CONTINUE IV ABX ROCEPHIN. PULMO 
FOLLOWING. DC PLAN TO GO HOME WHEN STABLE CM TO FOLLOW

## 2023-02-07 NOTE — NUR
TRANSFER OF CARE FROM DAY SHIFT, REPORT RECEIVED FROM MARGIE ARRIAGA.  PATIENT IS ASLEEP IN BED, 
EASILY AROUSED.  PATIENT HAS BIPAP IN PLACE WITH THE CURRENT SETTINGS: RATE = 14, FIO2 = 
40%, I/E = 18/5.  PATIENT HAS 20G IV IN THE LEFT AC WITH THE FOLLOWING MEDICATION INFUSING: 
PRECEDEX 400MCG RUNNING AT 0.6MCG/KG/HR.  PATIENT HAS THE FOLLOWING VITALS AT START OF 
SHIFT: TEMP = 97.1, HR = 95, R = 26, O2 SAT = 96%, AND BP = 103/62.  WILL CONTINUE TO 
MONITOR PATIENT

## 2023-02-08 VITALS — SYSTOLIC BLOOD PRESSURE: 125 MMHG | DIASTOLIC BLOOD PRESSURE: 90 MMHG

## 2023-02-08 VITALS — SYSTOLIC BLOOD PRESSURE: 143 MMHG | DIASTOLIC BLOOD PRESSURE: 77 MMHG

## 2023-02-08 VITALS — SYSTOLIC BLOOD PRESSURE: 123 MMHG | DIASTOLIC BLOOD PRESSURE: 76 MMHG

## 2023-02-08 VITALS — SYSTOLIC BLOOD PRESSURE: 130 MMHG | DIASTOLIC BLOOD PRESSURE: 87 MMHG

## 2023-02-08 VITALS — SYSTOLIC BLOOD PRESSURE: 115 MMHG | DIASTOLIC BLOOD PRESSURE: 61 MMHG

## 2023-02-08 VITALS — DIASTOLIC BLOOD PRESSURE: 73 MMHG | SYSTOLIC BLOOD PRESSURE: 109 MMHG

## 2023-02-08 VITALS — SYSTOLIC BLOOD PRESSURE: 121 MMHG | DIASTOLIC BLOOD PRESSURE: 83 MMHG

## 2023-02-08 VITALS — DIASTOLIC BLOOD PRESSURE: 98 MMHG | SYSTOLIC BLOOD PRESSURE: 164 MMHG

## 2023-02-08 VITALS — DIASTOLIC BLOOD PRESSURE: 72 MMHG | SYSTOLIC BLOOD PRESSURE: 141 MMHG

## 2023-02-08 VITALS — DIASTOLIC BLOOD PRESSURE: 76 MMHG | SYSTOLIC BLOOD PRESSURE: 120 MMHG

## 2023-02-08 VITALS — SYSTOLIC BLOOD PRESSURE: 153 MMHG | DIASTOLIC BLOOD PRESSURE: 86 MMHG

## 2023-02-08 VITALS — SYSTOLIC BLOOD PRESSURE: 151 MMHG | DIASTOLIC BLOOD PRESSURE: 94 MMHG

## 2023-02-08 VITALS — SYSTOLIC BLOOD PRESSURE: 138 MMHG | DIASTOLIC BLOOD PRESSURE: 76 MMHG

## 2023-02-08 VITALS — DIASTOLIC BLOOD PRESSURE: 45 MMHG | SYSTOLIC BLOOD PRESSURE: 105 MMHG

## 2023-02-08 LAB
ANION GAP SERPL CALCULATED.3IONS-SCNC: 3.2 MMOL/L (ref 8–16)
BASOPHILS # BLD AUTO: 0 K/UL (ref 0–0.22)
BASOPHILS NFR BLD AUTO: 0.1 % (ref 0–2)
BUN SERPL-MCNC: 51 MG/DL (ref 7–18)
CHLORIDE SERPL-SCNC: 92 MMOL/L (ref 98–107)
CO2 SERPL-SCNC: 50.1 MMOL/L (ref 21–32)
CREAT SERPL-MCNC: 0.8 MG/DL (ref 0.6–1.3)
EOSINOPHIL # BLD AUTO: 0 K/UL (ref 0–0.4)
EOSINOPHIL NFR BLD AUTO: 0 % (ref 0–4)
ERYTHROCYTE [DISTWIDTH] IN BLOOD BY AUTOMATED COUNT: 14.4 % (ref 11.6–13.7)
GFR SERPL CREATININE-BSD FRML MDRD: 128 ML/MIN (ref 90–?)
GLUCOSE SERPL-MCNC: 152 MG/DL (ref 74–106)
HCT VFR BLD AUTO: 41.2 % (ref 36–52)
HGB BLD-MCNC: 13.5 G/DL (ref 12–18)
LYMPHOCYTES # BLD AUTO: 0.2 K/UL (ref 2–11.5)
LYMPHOCYTES NFR BLD AUTO: 1.7 % (ref 20.5–51.1)
MCH RBC QN AUTO: 30 PG (ref 27–31)
MCHC RBC AUTO-ENTMCNC: 33 G/DL (ref 33–37)
MCV RBC AUTO: 92.4 FL (ref 80–94)
MONOCYTES # BLD AUTO: 0.4 K/UL (ref 0.8–1)
MONOCYTES NFR BLD AUTO: 2.9 % (ref 1.7–9.3)
NEUTROPHILS # BLD AUTO: 12.8 K/UL (ref 1.8–7.7)
NEUTROPHILS NFR BLD AUTO: 95.3 % (ref 42.2–75.2)
PLATELET # BLD AUTO: 222 K/UL (ref 140–450)
POTASSIUM SERPL-SCNC: 5.3 MMOL/L (ref 3.5–5.1)
RBC # BLD AUTO: 4.46 MIL/UL (ref 4.2–6.1)
SODIUM SERPL-SCNC: 140 MMOL/L (ref 136–145)
WBC # BLD AUTO: 13.4 K/UL (ref 4.8–10.8)

## 2023-02-08 RX ADMIN — IPRATROPIUM BROMIDE AND ALBUTEROL SULFATE SCH ML: .5; 3 SOLUTION RESPIRATORY (INHALATION) at 10:53

## 2023-02-08 RX ADMIN — IPRATROPIUM BROMIDE AND ALBUTEROL SULFATE SCH ML: .5; 3 SOLUTION RESPIRATORY (INHALATION) at 08:30

## 2023-02-08 RX ADMIN — GABAPENTIN SCH MG: 100 CAPSULE ORAL at 08:42

## 2023-02-08 RX ADMIN — SODIUM CHLORIDE SCH MLS/HR: 9 INJECTION, SOLUTION INTRAVENOUS at 08:43

## 2023-02-08 RX ADMIN — SODIUM CHLORIDE SCH MLS/HR: 9 INJECTION, SOLUTION INTRAVENOUS at 21:05

## 2023-02-08 RX ADMIN — IPRATROPIUM BROMIDE AND ALBUTEROL SULFATE SCH ML: .5; 3 SOLUTION RESPIRATORY (INHALATION) at 16:34

## 2023-02-08 RX ADMIN — IPRATROPIUM BROMIDE AND ALBUTEROL SULFATE SCH ML: .5; 3 SOLUTION RESPIRATORY (INHALATION) at 03:49

## 2023-02-08 RX ADMIN — WATER SCH MG: 1 INJECTION INTRAMUSCULAR; INTRAVENOUS; SUBCUTANEOUS at 12:33

## 2023-02-08 RX ADMIN — INSULIN HUMAN PRN MLS/HR: 100 INJECTION, SOLUTION PARENTERAL at 18:01

## 2023-02-08 RX ADMIN — WATER SCH MG: 1 INJECTION INTRAMUSCULAR; INTRAVENOUS; SUBCUTANEOUS at 17:41

## 2023-02-08 RX ADMIN — MORPHINE SULFATE PRN MG: 2 INJECTION, SOLUTION INTRAMUSCULAR; INTRAVENOUS at 09:38

## 2023-02-08 RX ADMIN — MORPHINE SULFATE PRN MG: 2 INJECTION, SOLUTION INTRAMUSCULAR; INTRAVENOUS at 18:48

## 2023-02-08 RX ADMIN — IPRATROPIUM BROMIDE AND ALBUTEROL SULFATE SCH ML: .5; 3 SOLUTION RESPIRATORY (INHALATION) at 19:28

## 2023-02-08 RX ADMIN — WATER SCH MG: 1 INJECTION INTRAMUSCULAR; INTRAVENOUS; SUBCUTANEOUS at 06:53

## 2023-02-08 RX ADMIN — INSULIN HUMAN PRN MLS/HR: 100 INJECTION, SOLUTION PARENTERAL at 07:27

## 2023-02-08 RX ADMIN — GABAPENTIN SCH MG: 100 CAPSULE ORAL at 12:34

## 2023-02-08 RX ADMIN — MORPHINE SULFATE PRN MG: 2 INJECTION, SOLUTION INTRAMUSCULAR; INTRAVENOUS at 13:41

## 2023-02-08 RX ADMIN — LOSARTAN POTASSIUM SCH MG: 50 TABLET, FILM COATED ORAL at 08:43

## 2023-02-08 RX ADMIN — GABAPENTIN SCH MG: 100 CAPSULE ORAL at 17:41

## 2023-02-08 RX ADMIN — MORPHINE SULFATE PRN MG: 2 INJECTION, SOLUTION INTRAMUSCULAR; INTRAVENOUS at 00:04

## 2023-02-08 RX ADMIN — IPRATROPIUM BROMIDE AND ALBUTEROL SULFATE SCH ML: .5; 3 SOLUTION RESPIRATORY (INHALATION) at 23:00

## 2023-02-08 NOTE — NUR
Seen and examined by Dr. Flores. Updated regarding pt work of breathing. Per Dr. Flores, pt 
to remain in ICU.

## 2023-02-08 NOTE — NUR
TRANSFER OF CARE FROM DAY SHIFT, REPORT RECEIVED FROM MARGIE NETTLES.  PATIENT IS ASLEEP IN BED, 
EASILY AROUSED.  PATIENT HAS NASAL CANNULA IN PLACE, AT 4LPM.   PATIENT HAS 20G IV IN THE 
LEFT AC WITH THE FOLLOWING MEDICATION INFUSING: PRECEDEX 400MCG RUNNING AT 0.6MCG/KG/HR. AND 
NS INFUSING AT 75ML/HR PATIENT HAS THE FOLLOWING VITALS AT START OF SHIFT: TEMP = 97.8, HR = 
100, R = 23, O2 SAT = 97%, AND BP = 138/84  WILL CONTINUE TO MONITOR PATIENT

## 2023-02-08 NOTE — NUR
Received pt verbally responsive, A&Ox3. On Bipap 18/5 rate 14 FiO2@40%. Sinus tach on 
monitor. Abd with active bowel sounds. Continent bowel and bladder. Peripheral IV 20 gauge 
on LAC intact and patent infusing Precedex @0.6mcg/kg/min. Safety precautions in place.

## 2023-02-08 NOTE — NUR
RT AT BEDSIDE, PATIENT REPORTS "NOT GETTING ENOUGH AIR".  MASK TO BIPAP WAS READJUSTED AND 
PATIENT WAS GIVEN BREATHING TREATMENT

## 2023-02-09 VITALS — SYSTOLIC BLOOD PRESSURE: 121 MMHG | DIASTOLIC BLOOD PRESSURE: 77 MMHG

## 2023-02-09 VITALS — DIASTOLIC BLOOD PRESSURE: 98 MMHG | SYSTOLIC BLOOD PRESSURE: 133 MMHG

## 2023-02-09 VITALS — DIASTOLIC BLOOD PRESSURE: 79 MMHG | SYSTOLIC BLOOD PRESSURE: 135 MMHG

## 2023-02-09 VITALS — DIASTOLIC BLOOD PRESSURE: 115 MMHG | SYSTOLIC BLOOD PRESSURE: 162 MMHG

## 2023-02-09 VITALS — SYSTOLIC BLOOD PRESSURE: 152 MMHG | DIASTOLIC BLOOD PRESSURE: 64 MMHG

## 2023-02-09 VITALS — SYSTOLIC BLOOD PRESSURE: 111 MMHG | DIASTOLIC BLOOD PRESSURE: 69 MMHG

## 2023-02-09 VITALS — DIASTOLIC BLOOD PRESSURE: 74 MMHG | SYSTOLIC BLOOD PRESSURE: 113 MMHG

## 2023-02-09 VITALS — DIASTOLIC BLOOD PRESSURE: 70 MMHG | SYSTOLIC BLOOD PRESSURE: 107 MMHG

## 2023-02-09 VITALS — SYSTOLIC BLOOD PRESSURE: 165 MMHG | DIASTOLIC BLOOD PRESSURE: 92 MMHG

## 2023-02-09 VITALS — SYSTOLIC BLOOD PRESSURE: 114 MMHG | DIASTOLIC BLOOD PRESSURE: 72 MMHG

## 2023-02-09 VITALS — DIASTOLIC BLOOD PRESSURE: 97 MMHG | SYSTOLIC BLOOD PRESSURE: 154 MMHG

## 2023-02-09 VITALS — DIASTOLIC BLOOD PRESSURE: 76 MMHG | SYSTOLIC BLOOD PRESSURE: 122 MMHG

## 2023-02-09 VITALS — DIASTOLIC BLOOD PRESSURE: 29 MMHG | SYSTOLIC BLOOD PRESSURE: 61 MMHG

## 2023-02-09 VITALS — DIASTOLIC BLOOD PRESSURE: 71 MMHG | SYSTOLIC BLOOD PRESSURE: 121 MMHG

## 2023-02-09 VITALS — SYSTOLIC BLOOD PRESSURE: 148 MMHG | DIASTOLIC BLOOD PRESSURE: 90 MMHG

## 2023-02-09 VITALS — SYSTOLIC BLOOD PRESSURE: 149 MMHG | DIASTOLIC BLOOD PRESSURE: 85 MMHG

## 2023-02-09 VITALS — DIASTOLIC BLOOD PRESSURE: 84 MMHG | SYSTOLIC BLOOD PRESSURE: 131 MMHG

## 2023-02-09 VITALS — SYSTOLIC BLOOD PRESSURE: 104 MMHG | DIASTOLIC BLOOD PRESSURE: 69 MMHG

## 2023-02-09 VITALS — SYSTOLIC BLOOD PRESSURE: 119 MMHG | DIASTOLIC BLOOD PRESSURE: 83 MMHG

## 2023-02-09 VITALS — DIASTOLIC BLOOD PRESSURE: 68 MMHG | SYSTOLIC BLOOD PRESSURE: 107 MMHG

## 2023-02-09 VITALS — SYSTOLIC BLOOD PRESSURE: 170 MMHG | DIASTOLIC BLOOD PRESSURE: 77 MMHG

## 2023-02-09 VITALS — DIASTOLIC BLOOD PRESSURE: 70 MMHG | SYSTOLIC BLOOD PRESSURE: 114 MMHG

## 2023-02-09 VITALS — DIASTOLIC BLOOD PRESSURE: 80 MMHG | SYSTOLIC BLOOD PRESSURE: 121 MMHG

## 2023-02-09 VITALS — SYSTOLIC BLOOD PRESSURE: 118 MMHG | DIASTOLIC BLOOD PRESSURE: 74 MMHG

## 2023-02-09 LAB
ANION GAP SERPL CALCULATED.3IONS-SCNC: 3 MMOL/L (ref 8–16)
BASOPHILS # BLD AUTO: 0 K/UL (ref 0–0.22)
BASOPHILS NFR BLD AUTO: 0 % (ref 0–2)
BUN SERPL-MCNC: 30 MG/DL (ref 7–18)
CHLORIDE SERPL-SCNC: 96 MMOL/L (ref 98–107)
CO2 SERPL-SCNC: 50 MMOL/L (ref 21–32)
CREAT SERPL-MCNC: 0.6 MG/DL (ref 0.6–1.3)
EOSINOPHIL # BLD AUTO: 0 K/UL (ref 0–0.4)
EOSINOPHIL NFR BLD AUTO: 0 % (ref 0–4)
ERYTHROCYTE [DISTWIDTH] IN BLOOD BY AUTOMATED COUNT: 14.1 % (ref 11.6–13.7)
GFR SERPL CREATININE-BSD FRML MDRD: 178 ML/MIN (ref 90–?)
GLUCOSE SERPL-MCNC: 138 MG/DL (ref 74–106)
HCT VFR BLD AUTO: 41.3 % (ref 36–52)
HGB BLD-MCNC: 13.3 G/DL (ref 12–18)
LYMPHOCYTES # BLD AUTO: 0.2 K/UL (ref 2–11.5)
LYMPHOCYTES NFR BLD AUTO: 1.7 % (ref 20.5–51.1)
MCH RBC QN AUTO: 30 PG (ref 27–31)
MCHC RBC AUTO-ENTMCNC: 32 G/DL (ref 33–37)
MCV RBC AUTO: 93.4 FL (ref 80–94)
MONOCYTES # BLD AUTO: 0.4 K/UL (ref 0.8–1)
MONOCYTES NFR BLD AUTO: 4 % (ref 1.7–9.3)
NEUTROPHILS # BLD AUTO: 9.4 K/UL (ref 1.8–7.7)
NEUTROPHILS NFR BLD AUTO: 94.3 % (ref 42.2–75.2)
PLATELET # BLD AUTO: 187 K/UL (ref 140–450)
POTASSIUM SERPL-SCNC: 5 MMOL/L (ref 3.5–5.1)
RBC # BLD AUTO: 4.42 MIL/UL (ref 4.2–6.1)
SODIUM SERPL-SCNC: 144 MMOL/L (ref 136–145)
WBC # BLD AUTO: 9.9 K/UL (ref 4.8–10.8)

## 2023-02-09 RX ADMIN — IPRATROPIUM BROMIDE AND ALBUTEROL SULFATE SCH ML: .5; 3 SOLUTION RESPIRATORY (INHALATION) at 23:22

## 2023-02-09 RX ADMIN — IPRATROPIUM BROMIDE AND ALBUTEROL SULFATE SCH ML: .5; 3 SOLUTION RESPIRATORY (INHALATION) at 16:22

## 2023-02-09 RX ADMIN — IPRATROPIUM BROMIDE AND ALBUTEROL SULFATE SCH ML: .5; 3 SOLUTION RESPIRATORY (INHALATION) at 19:24

## 2023-02-09 RX ADMIN — WATER SCH MG: 1 INJECTION INTRAMUSCULAR; INTRAVENOUS; SUBCUTANEOUS at 12:20

## 2023-02-09 RX ADMIN — GABAPENTIN SCH MG: 100 CAPSULE ORAL at 08:25

## 2023-02-09 RX ADMIN — INSULIN HUMAN PRN MLS/HR: 100 INJECTION, SOLUTION PARENTERAL at 02:12

## 2023-02-09 RX ADMIN — WATER SCH MG: 1 INJECTION INTRAMUSCULAR; INTRAVENOUS; SUBCUTANEOUS at 17:17

## 2023-02-09 RX ADMIN — SODIUM CHLORIDE SCH MLS/HR: 9 INJECTION, SOLUTION INTRAVENOUS at 10:25

## 2023-02-09 RX ADMIN — LOSARTAN POTASSIUM SCH MG: 50 TABLET, FILM COATED ORAL at 08:25

## 2023-02-09 RX ADMIN — WATER SCH MG: 1 INJECTION INTRAMUSCULAR; INTRAVENOUS; SUBCUTANEOUS at 06:07

## 2023-02-09 RX ADMIN — WATER SCH MG: 1 INJECTION INTRAMUSCULAR; INTRAVENOUS; SUBCUTANEOUS at 23:46

## 2023-02-09 RX ADMIN — WATER SCH MG: 1 INJECTION INTRAMUSCULAR; INTRAVENOUS; SUBCUTANEOUS at 00:39

## 2023-02-09 RX ADMIN — IPRATROPIUM BROMIDE AND ALBUTEROL SULFATE SCH ML: .5; 3 SOLUTION RESPIRATORY (INHALATION) at 03:59

## 2023-02-09 RX ADMIN — IPRATROPIUM BROMIDE AND ALBUTEROL SULFATE SCH ML: .5; 3 SOLUTION RESPIRATORY (INHALATION) at 08:04

## 2023-02-09 RX ADMIN — IPRATROPIUM BROMIDE AND ALBUTEROL SULFATE SCH ML: .5; 3 SOLUTION RESPIRATORY (INHALATION) at 12:00

## 2023-02-09 RX ADMIN — GABAPENTIN SCH MG: 100 CAPSULE ORAL at 12:20

## 2023-02-09 RX ADMIN — SODIUM CHLORIDE PRN MLS/HR: 9 INJECTION, SOLUTION INTRAVENOUS at 23:32

## 2023-02-09 RX ADMIN — INSULIN HUMAN PRN MLS/HR: 100 INJECTION, SOLUTION PARENTERAL at 17:47

## 2023-02-09 RX ADMIN — MORPHINE SULFATE PRN MG: 2 INJECTION, SOLUTION INTRAMUSCULAR; INTRAVENOUS at 13:38

## 2023-02-09 RX ADMIN — GABAPENTIN SCH MG: 100 CAPSULE ORAL at 17:17

## 2023-02-09 NOTE — NUR
DR. MORRIS TALKED TO PATIENT'S WIFE AND DECIDED TO PUT THE PATIENT ON COMFORT CARE; MD 
ORDERED TO START ON MORPHINE DRIP FOR COMFORT CARE  PER PROTOCOL.

## 2023-02-09 NOTE — NUR
MESSAGE SENT TO MD (DR. MORRIS), REGARDING PATIENT'S DOWNTRENDING BLOOD PRESSURE.  PRECEDEX 
TITRATED DOWN TO 0.4.

## 2023-02-09 NOTE — NUR
SATURATION 100% ON SUPPLEMENTAL OXYGEN AT 8 LPM VIA BUBBLER NC; POST HHN THERAPY TITRATED 
FIO2 TO 4 LPM; 

JOHN DOSS

## 2023-02-09 NOTE — NUR
2/9/23 RD INITIAL ASSESSMENT COMPLETED



PLEASE REFER TO NUTRITION ASSESSMENT UNDER CARE ACTIVITY FOR ESTIMATED NUTRITIONAL NEEDS. 



1. CONTINUE SOFT DIET AS TOLERATED 

2. RECOMMEND ENSURE 1/DAY TO HELP INCREASE PO INTAKE

- PROVIDES 350 KCAL AND 20 GM PROTEIN DAILY

3. RD TO FOLLOW-UP 2-3 DAYS, HIGH RISK 



REVIEWED BY LAM CAVANAUGH RD

## 2023-02-09 NOTE — NUR
RECEIVED BEDSIDE REPORT FROM NIGHT SHIFT MEGAN HANSON. PT IS SLEEPING. NC 7L. SR ON MONITOR. IV 
LT AC 20G, RUNNING PRECEDEX @ 0.4 MCG/KG/H, NS @75 MLS/HR. SOFT DIET. INDEPENDENT EATER. 
GENERALIZED WEAKNESS, BEDREST. SKIN INTACT, BEDSIDE COMMODE IN USE AND URINAL AT BEDSIDE. 
BED TO LOWEST POSITION, HOB ELEVATED, CALL LIGHT WITHIN REACH, WILL CONTINUE TO MONITOR. 

-------------------------------------------------------------------------------

Addendum: 02/09/23 at 0930 by Carlie Louis RN

-------------------------------------------------------------------------------

NC 10L

## 2023-02-10 VITALS — DIASTOLIC BLOOD PRESSURE: 45 MMHG | SYSTOLIC BLOOD PRESSURE: 90 MMHG

## 2023-02-10 VITALS — DIASTOLIC BLOOD PRESSURE: 67 MMHG | SYSTOLIC BLOOD PRESSURE: 104 MMHG

## 2023-02-10 VITALS — DIASTOLIC BLOOD PRESSURE: 52 MMHG | SYSTOLIC BLOOD PRESSURE: 121 MMHG

## 2023-02-10 VITALS — SYSTOLIC BLOOD PRESSURE: 87 MMHG | DIASTOLIC BLOOD PRESSURE: 55 MMHG

## 2023-02-10 VITALS — DIASTOLIC BLOOD PRESSURE: 46 MMHG | SYSTOLIC BLOOD PRESSURE: 95 MMHG

## 2023-02-10 VITALS — DIASTOLIC BLOOD PRESSURE: 66 MMHG | SYSTOLIC BLOOD PRESSURE: 107 MMHG

## 2023-02-10 VITALS — DIASTOLIC BLOOD PRESSURE: 47 MMHG | SYSTOLIC BLOOD PRESSURE: 86 MMHG

## 2023-02-10 VITALS — DIASTOLIC BLOOD PRESSURE: 49 MMHG | SYSTOLIC BLOOD PRESSURE: 100 MMHG

## 2023-02-10 VITALS — SYSTOLIC BLOOD PRESSURE: 89 MMHG | DIASTOLIC BLOOD PRESSURE: 44 MMHG

## 2023-02-10 VITALS — SYSTOLIC BLOOD PRESSURE: 110 MMHG | DIASTOLIC BLOOD PRESSURE: 63 MMHG

## 2023-02-10 RX ADMIN — WATER SCH MG: 1 INJECTION INTRAMUSCULAR; INTRAVENOUS; SUBCUTANEOUS at 18:32

## 2023-02-10 RX ADMIN — SODIUM CHLORIDE PRN MLS/HR: 9 INJECTION, SOLUTION INTRAVENOUS at 09:50

## 2023-02-10 RX ADMIN — SODIUM CHLORIDE PRN MLS/HR: 9 INJECTION, SOLUTION INTRAVENOUS at 02:40

## 2023-02-10 RX ADMIN — IPRATROPIUM BROMIDE AND ALBUTEROL SULFATE SCH ML: .5; 3 SOLUTION RESPIRATORY (INHALATION) at 19:00

## 2023-02-10 RX ADMIN — SODIUM CHLORIDE PRN MLS/HR: 9 INJECTION, SOLUTION INTRAVENOUS at 17:26

## 2023-02-10 RX ADMIN — IPRATROPIUM BROMIDE AND ALBUTEROL SULFATE SCH ML: .5; 3 SOLUTION RESPIRATORY (INHALATION) at 07:26

## 2023-02-10 RX ADMIN — IPRATROPIUM BROMIDE AND ALBUTEROL SULFATE SCH ML: .5; 3 SOLUTION RESPIRATORY (INHALATION) at 15:00

## 2023-02-10 RX ADMIN — INSULIN HUMAN PRN MLS/HR: 100 INJECTION, SOLUTION PARENTERAL at 02:42

## 2023-02-10 RX ADMIN — IPRATROPIUM BROMIDE AND ALBUTEROL SULFATE SCH ML: .5; 3 SOLUTION RESPIRATORY (INHALATION) at 03:00

## 2023-02-10 RX ADMIN — WATER SCH MG: 1 INJECTION INTRAMUSCULAR; INTRAVENOUS; SUBCUTANEOUS at 06:43

## 2023-02-10 NOTE — NUR
RECIEVED THE PATIENT FROM ICU.ON DNR.,DNI STATUS.ON 10%O2 ON MASK.ON HOSPICE CARE,10 ML 
MORPHINE PER HOUR ON FLOW.WILL CONTINUE TO MONITOR.

## 2023-02-10 NOTE — NUR
TITRATED THE MORPHINE DRIP TO 12ML/HR.STILL MONITORING FOR HOSPICE CARE.HEART RATE 108,BP 
121/52.RR 21.ON 10L O2 VIA MASK.

## 2023-02-10 NOTE — NUR
SEEN AND EXAMINED BY DR. RAY. ORDERS TO DC BEDSIDE EKG MONITORING AND DC SUPPLEMENTAL O2. 

-------------------------------------------------------------------------------

Addendum: 02/10/23 at 1215 by Aniya Encarnacion RN

-------------------------------------------------------------------------------

UPDATED VERBAL ORDERS BY DR. RAY, CONTINUE SUPPLEMENTAL NC O2, SOLU-MEDROL 40MG Q6H, 
DUONEBS Q4H WHILE AWAKE. 

-------------------------------------------------------------------------------

Addendum: 02/10/23 at 1219 by Aniya Encarnacion RN

-------------------------------------------------------------------------------

NO BIPAP

## 2023-02-10 NOTE — NUR
PATIENT WITH SIMPLE MASK AT 10L SATING 91%, WITH LABORED BREATHING. PATIENT UNDER HOSPICE 
CARE, DNR, DNI.

## 2023-02-10 NOTE — NUR
RECEIVED BEDSIDE REPORT FROM SARAH NIGHT SHIFT RN, FOR CONTINUITY OF CARE. PT CURRENTLY 
RESTING BUT AROUSABLE AND ORIENTED. RESPIRATIONS EVEN AND UNLABORED. 10L NC WITH BUBBLER. SR 
ON MONITOR. LAC 20G IV INFUSING MORPHINE DRIP AT 10 MG/HR AND PRECEDEX DRIP AT 0.7 
MCG/KG/HR. COMFORT MEASURES IN PLACE. F/C TO GRAVITY DRAINING CLEAR YELLOW URINE WITH 
MINIMAL SEDIMENT. MILD WEAKNESS THROUGHOUT. STANDARD PRECAUTION. CALL LIGHT WITHIN REACH. 
BED LOCKED AND IN LOWEST POSITION.

## 2023-02-11 VITALS — SYSTOLIC BLOOD PRESSURE: 119 MMHG | DIASTOLIC BLOOD PRESSURE: 54 MMHG

## 2023-02-11 VITALS — DIASTOLIC BLOOD PRESSURE: 30 MMHG | SYSTOLIC BLOOD PRESSURE: 73 MMHG

## 2023-02-11 VITALS — SYSTOLIC BLOOD PRESSURE: 68 MMHG | DIASTOLIC BLOOD PRESSURE: 33 MMHG

## 2023-02-11 LAB
ANION GAP SERPL CALCULATED.3IONS-SCNC: 3.5 MMOL/L (ref 8–16)
BASOPHILS # BLD AUTO: 0 K/UL (ref 0–0.22)
BASOPHILS NFR BLD AUTO: 0.2 % (ref 0–2)
BUN SERPL-MCNC: 80 MG/DL (ref 7–18)
CHLORIDE SERPL-SCNC: 94 MMOL/L (ref 98–107)
CO2 SERPL-SCNC: 52 MMOL/L (ref 21–32)
CREAT SERPL-MCNC: 3.4 MG/DL (ref 0.6–1.3)
EOSINOPHIL # BLD AUTO: 0 K/UL (ref 0–0.4)
EOSINOPHIL NFR BLD AUTO: 0 % (ref 0–4)
ERYTHROCYTE [DISTWIDTH] IN BLOOD BY AUTOMATED COUNT: 13.5 % (ref 11.6–13.7)
GFR SERPL CREATININE-BSD FRML MDRD: 24 ML/MIN (ref 90–?)
GLUCOSE SERPL-MCNC: 98 MG/DL (ref 74–106)
HCT VFR BLD AUTO: 46 % (ref 36–52)
HGB BLD-MCNC: 14.3 G/DL (ref 12–18)
LYMPHOCYTES # BLD AUTO: 0.3 K/UL (ref 2–11.5)
LYMPHOCYTES NFR BLD AUTO: 1 % (ref 20.5–51.1)
MCH RBC QN AUTO: 30 PG (ref 27–31)
MCHC RBC AUTO-ENTMCNC: 31 G/DL (ref 33–37)
MCV RBC AUTO: 96.8 FL (ref 80–94)
MONOCYTES # BLD AUTO: 1.4 K/UL (ref 0.8–1)
MONOCYTES NFR BLD AUTO: 5.6 % (ref 1.7–9.3)
NEUTROPHILS # BLD AUTO: 23.1 K/UL (ref 1.8–7.7)
NEUTROPHILS NFR BLD AUTO: 93.2 % (ref 42.2–75.2)
PLATELET # BLD AUTO: 233 K/UL (ref 140–450)
POTASSIUM SERPL-SCNC: 4.5 MMOL/L (ref 3.5–5.1)
RBC # BLD AUTO: 4.75 MIL/UL (ref 4.2–6.1)
SODIUM SERPL-SCNC: 145 MMOL/L (ref 136–145)
WBC # BLD AUTO: 24.7 K/UL (ref 4.8–10.8)

## 2023-02-11 RX ADMIN — WATER SCH MG: 1 INJECTION INTRAMUSCULAR; INTRAVENOUS; SUBCUTANEOUS at 12:22

## 2023-02-11 RX ADMIN — WATER SCH MG: 1 INJECTION INTRAMUSCULAR; INTRAVENOUS; SUBCUTANEOUS at 00:00

## 2023-02-11 RX ADMIN — WATER SCH MG: 1 INJECTION INTRAMUSCULAR; INTRAVENOUS; SUBCUTANEOUS at 05:53

## 2023-02-11 RX ADMIN — SODIUM CHLORIDE PRN MLS/HR: 9 INJECTION, SOLUTION INTRAVENOUS at 08:21

## 2023-02-11 RX ADMIN — WATER SCH MG: 1 INJECTION INTRAMUSCULAR; INTRAVENOUS; SUBCUTANEOUS at 18:00

## 2023-02-11 NOTE — NUR
PATIENT'S FAMILY DECIDED TO MAKE PATIENT DNR AND PT ON COMFORT MEASURES ONLY AT THIS TIME 



WILL DISCONTINUE NUTRITION ASSESSMENTS AND FOLLOW UPS  



RD WILL REMAIN AVAILABLE FOR CONSULTS AS NEEDS AND WILL RESTART NUTRITION ASSESSMENT IF 
REQUESTED 



LAM CAVANAUGH RD

## 2023-02-11 NOTE — NUR
PT IS COMFORTABLE AT THIS TIME WITH NI SIGNS OF DISTRESS. BP IS 71/30, , STATING AT 
94% ON 15L NONREBREATHER. MORPHINE BAG CHANGED.

## 2023-02-11 NOTE — NUR
CALLED [facility] AND NOTIFIED THEM THAT PT HAD PASSED AWAY. PROVIDED NEEDED INFORMATION. 
STATED THEY WILL CALL BACK.

## 2023-02-11 NOTE — NUR
Per order from Dr. Galvan, pt placed on 2 L via Nasal Canula. Patient saturation was down to 
78%. Pt is guppy breathing

## 2023-02-11 NOTE — NUR
FAMILY CALLED ME TO THE ROOM TO CHECK THE PATIENTS VITAL SIGNS. WAS UNABLE TO OBTAIN. CALLED 
ER AND ASKED IF PHYSICIAN CAN COME TO PRONOUNCE A TIME OF DEATH. ER DR CHACON CAME AND 
PRONOUNCED PT TIME OF DEATH 1744.

## 2023-02-11 NOTE — NUR
CALLED [facility]ICE AND NOTIFIED THEM OF PATIENTS DEATH. DISPATCH STATED THEY WILL GIVE 
US A CALL BACK.

## 2023-02-11 NOTE — NUR
At 1450 Pt saturation was decreasing. When oxygenation was at 85, tried pt on BiPAP. When 
BiPAP initiated Pt was at 82%. Pt did rebound to 86% but felt since pt is on comfort care, 
the BiPAP was not offering enough benefit. Placed Pt back on nonrebreather at 1601. Pt 
vitals are O2 86%, HR 82, RR 20, and BP 66/30. Nurse was alerted of these events. RT will 
keep Pt as comfortable as possible and will continue to monitor.

-------------------------------------------------------------------------------

Addendum: 02/11/23 at 1650 by Ashly Montgomery RT

-------------------------------------------------------------------------------

PT could not be aroused at all. He was non-responsive but eyelashes would flutter. Pt began 
agonal breathing.

## 2023-02-11 NOTE — NUR
CALLED DR DSOUZA ABOUT PTS OXYGEN NEED.  STATED THAT PT SHOULD BE ON 2L NC FOR COMFORT 
MEASURE. NOTIFIED RT AND THEY STATED THEY WOULD CHANGE.

## 2023-02-11 NOTE — NUR
PATIENT IS DESATTING 84%, RT MADE AWARE AND PUT HIM TO NON REBREATHER 15L. PATIENT O2 SAT 
WENT UP TO 92%.

## 2023-02-11 NOTE — NUR
ENDORSED PTS INFORMATION TO NIGHT SHIFT NURSE COY. PROVIDED COY WITH THE CASE 
NUMBERS AND NEEDED INFORMATION.
